# Patient Record
Sex: FEMALE | Race: WHITE
[De-identification: names, ages, dates, MRNs, and addresses within clinical notes are randomized per-mention and may not be internally consistent; named-entity substitution may affect disease eponyms.]

---

## 2017-08-09 ENCOUNTER — HOSPITAL ENCOUNTER (OUTPATIENT)
Dept: HOSPITAL 45 - C.PAPS | Age: 72
Discharge: HOME | End: 2017-08-09
Attending: OBSTETRICS & GYNECOLOGY
Payer: COMMERCIAL

## 2017-08-09 DIAGNOSIS — Z12.4: Primary | ICD-10-CM

## 2018-02-14 ENCOUNTER — HOSPITAL ENCOUNTER (OUTPATIENT)
Dept: HOSPITAL 45 - C.GI | Age: 73
Discharge: HOME | End: 2018-02-14
Attending: INTERNAL MEDICINE
Payer: COMMERCIAL

## 2018-02-14 VITALS — SYSTOLIC BLOOD PRESSURE: 146 MMHG | DIASTOLIC BLOOD PRESSURE: 80 MMHG | HEART RATE: 75 BPM | OXYGEN SATURATION: 97 %

## 2018-02-14 VITALS
HEIGHT: 62.01 IN | WEIGHT: 135.28 LBS | BODY MASS INDEX: 24.58 KG/M2 | BODY MASS INDEX: 24.58 KG/M2 | HEIGHT: 62.01 IN | WEIGHT: 135.28 LBS

## 2018-02-14 VITALS — TEMPERATURE: 98.06 F

## 2018-02-14 DIAGNOSIS — R93.3: ICD-10-CM

## 2018-02-14 DIAGNOSIS — I10: ICD-10-CM

## 2018-02-14 DIAGNOSIS — Z79.82: ICD-10-CM

## 2018-02-14 DIAGNOSIS — Z90.89: ICD-10-CM

## 2018-02-14 DIAGNOSIS — K44.9: ICD-10-CM

## 2018-02-14 DIAGNOSIS — Z79.899: ICD-10-CM

## 2018-02-14 DIAGNOSIS — R13.10: Primary | ICD-10-CM

## 2018-02-14 DIAGNOSIS — Z98.890: ICD-10-CM

## 2018-02-14 DIAGNOSIS — M19.90: ICD-10-CM

## 2018-02-14 NOTE — DISCHARGE INSTRUCTIONS
Endoscopy Patient Instructions


Date / Procedure(s) Performed


Feb 14, 2018.


EGD





Allergy Information


Coded Allergies:  


     No Known Allergies (Unverified , 2/14/18)





Discharge Date / Findings


Feb 14, 2018.


Large hiatal hernia





Medication Instructions


Stopped Medication(s):  


ASPIRIN 81MG 2/13/18 2300


OK to resume all medications today as prescribed





Reported Home Medications








 Medications  Dose


 Route/Sig


 Max Daily Dose Days Date Category


 


 Vitamin D3


  (Cholecalciferol)


 400 Inter.unit Tab  


 


    2/1/18 Reported


 


 Vitamin D3


  (Cholecalciferol)


 400 Unit Cap  400 Units


 PO QPM


    2/1/18 Reported


 


 Senokot S


  (Senna/Docusate


 Sodium) 1 Tab Tab  1 Tab


 PO HS PRN


    2/1/18 Reported


 


 Zestril


  (Lisinopril) 40


 Mg Tab  40 Mg


 PO QAM


    2/1/18 Reported


 


 Advil (Ibuprofen)


 200 Mg Tab  400 Mg


 PO TID PRN


    2/1/18 Reported


 


 Omega-3 (Fish


 Oil) 1 Ea Cap  1 Cap


 PO BID


    2/1/18 Reported


 


 Coq10 (Coenzyme


 Q10


  (Ubidecarenone))


 100 Mg Cap  100 Mg


 PO QAM


    2/1/18 Reported


 


 Centrum Adults


  (Multiple


 Vitamins W/


 Minerals) 1 Tab


 Tab  1 Tab


 PO QAM


    2/1/18 Reported


 


 Calcium 600 Mg Tab  600 Mg


 PO BID


    2/1/18 Reported


 


 Aspirin Ec


  (Aspirin) 81 Mg


 Tab  81 Mg


 PO QPM


    2/1/18 Reported











Provider Instructions





Activity Restrictions





-  No exercising or heavy lifting for 24 hours. 


-  Do not drink alcohol the day of the procedure.


-  Do not drive a car or operate machinery until the day after the procedure.


-  Do not make any important decisions or sign important papers in 24 hours 

after the procedure.





Following Day:





-  Return to full activity which may include returning to work/school.





Diet





Start your diet with liquids and light foods (jello, soup, juice, toast).  Then 

eat your usual diet if not nauseated.





Treatment For Common After Affects





For mild abdominal pain, bloating, or excessive gas:





-  Rest


-  Eat lightly


-  Lie on right side





Follow-Up Information


Follow-up with JESSICA HERNANDES as scheduled





Anesthesia Information





What You Should Know





You have had a procedure that required some medicine to reduce anxiety and 

discomfort. This treatment is called moderate sedation.  


After receiving the treatment, you may be sleepy, but you will be able to 

breathe on your own.  The effects of the treatment may last for several hours.








Follow these instructions along with Activity/Diet recommendations noted above:





*  Do NOT do anything where dizziness or clumsiness would be dangerous.





*  Rest quietly at home today, then you can be up and about tomorrow.





*  Have a responsible person stay with you the rest of today.





*  You may have had an I.V. today.  If so, you may take the dressing off later 

today.





Recommendations


 


Call your doctor if:





*  Trouble breathing 





*  Continuous vomiting for more than 24 hours








*  Temperature above 101 degrees





*  Severe abdominal pain or bloating





*  Pain not relieved by pain medicine ordered





*  There is increased drainage or redness from any incision





*  A large amount of rectal bleeding greater than 2-3 tablespoons. 


   (If you had a polyp/s removed or have hemorrhoids, a small amount of blood -


    from the rectum is to be expected.)





*  You have any unanswered questions or concerns.








IN THE EVENT OF A SERIOUS EMERGENCY, GO TO THE NEAREST EMERGENCY ROOM








       Your discharge instructions were prepared by provider Tashi Nunes.





 Patient Instructions Signature Page














Honey Morrell 











Patient (or Guardian) Signature/Date:____________________________________ I 

have read and understand the instructions given to me by my caregivers.








Caregiver/RN/Doctor Signature/Date:____________________________________











The above-named patient and/or guardian has received patient instructions on 

this date.





























+  Original Patient Signature Page (only) stays with chart.  Please make copy 

for patient.

## 2018-02-14 NOTE — ENDO HISTORY AND PHYSICAL
History & Physical


Date of Service:


Feb 14, 2018.


Chief Complaint:


ABNORMAL BARIUM SWALLOW, DYSPHAGIA


Referring Physician:


JESSICA HERNANDES


History of Present Illness


71 yo CF who presents for EGD secondary to esophageal dysphagia and abnormal 

barium swallow.





Past Surgical History


Hx Cardiac Surgery:  No


Hx Internal Defibrillator:  No


Hx Pacemaker:  No


Hx Abdominal Surgery:  No


Hx of Implantable Prosthesis:  No


Hx Post-Op Nausea and Vomiting:  No


Hx Cancer Surgery:  No


Hx Thoracic Surgery:  No


Hx Orthopedic:  No


Hx Urinary Tract Surgery:  No





Family History


None





Social History


Smoking Status:  Never Smoker


Hx Substance Use:  No


Hx Alcohol Use:  Yes (RARELY)





Allergies


Coded Allergies:  


     No Known Allergies (Unverified , 2/14/18)





Current Medications





Reported Home Medications








 Medications  Dose


 Route/Sig


 Max Daily Dose Days Date Category


 


 Vitamin D3


  (Cholecalciferol)


 400 Inter.unit Tab  


 


    2/1/18 Reported


 


 Vitamin D3


  (Cholecalciferol)


 400 Unit Cap  400 Units


 PO QPM


    2/1/18 Reported


 


 Senokot S


  (Senna/Docusate


 Sodium) 1 Tab Tab  1 Tab


 PO HS PRN


    2/1/18 Reported


 


 Zestril


  (Lisinopril) 40


 Mg Tab  40 Mg


 PO QAM


    2/1/18 Reported


 


 Advil (Ibuprofen)


 200 Mg Tab  400 Mg


 PO TID PRN


    2/1/18 Reported


 


 Omega-3 (Fish


 Oil) 1 Ea Cap  1 Cap


 PO BID


    2/1/18 Reported


 


 Coq10 (Coenzyme


 Q10


  (Ubidecarenone))


 100 Mg Cap  100 Mg


 PO QAM


    2/1/18 Reported


 


 Centrum Adults


  (Multiple


 Vitamins W/


 Minerals) 1 Tab


 Tab  1 Tab


 PO QAM


    2/1/18 Reported


 


 Calcium 600 Mg Tab  600 Mg


 PO BID


    2/1/18 Reported


 


 Aspirin Ec


  (Aspirin) 81 Mg


 Tab  81 Mg


 PO QPM


    2/1/18 Reported











Vital Signs


Weight (Kilograms):  61.36


Height (Feet):  5


Height (Inches):  2











  Date Time  Temp Pulse Resp B/P (MAP) Pulse Ox O2 Delivery O2 Flow Rate FiO2


 


2/14/18 11:40 36.7 88 20 161/81 (107) 98 Room Air  











Physical Exam


General Appearance:  WD/WN, no apparent distress


Respiratory/Chest:  


   Auscultation:  breath sounds normal


Cardiovascular:  


   Heart Auscultation:  RRR


Abdomen:  


   Bowel Sounds:  normal


   Inspection & Palpation:  soft, non-distended, no tenderness, guarding & 

rebound





Assessment and Plan


Assessment:


71 yo CF who presents for EGD secondary to esophageal dysphagia and abnormal 

barium swallow.








Plan:


Proceed with EGD.

## 2018-02-14 NOTE — GI REPORT
Procedure Date: 2/14/2018 11:50 AM

Procedure:            Upper GI endoscopy

Indications:          Dysphagia, Abnormal UGI series

Medicines:            Monitored Anesthesia Care

Complications:        No immediate complications.

Estimated Blood Loss: Estimated blood loss: none.

Procedure:            Pre-Anesthesia Assessment:

                      - Prior to the procedure, a History and Physical was 

                      performed, and patient medications and allergies were 

                      reviewed. The patient's tolerance of previous 

                      anesthesia was also reviewed. The risks and benefits of 

                      the procedure and the sedation options and risks were 

                      discussed with the patient. All questions were 

                      answered, and informed consent was obtained. Prior 

                      Anticoagulants: The patient has taken aspirin, last 

                      dose was 1 day prior to procedure. ASA Grade 

                      Assessment: III - A patient with severe systemic 

                      disease. After reviewing the risks and benefits, the 

                      patient was deemed in satisfactory condition to undergo 

                      the procedure.

                      After obtaining informed consent, the endoscope was 

                      passed under direct vision. Throughout the procedure, 

                      the patient's blood pressure, pulse, and oxygen 

                      saturations were monitored continuously. The scope was 

                      introduced through the mouth, and advanced to the 

                      second part of duodenum. The upper GI endoscopy was 

                      accomplished without difficulty. The patient tolerated 

                      the procedure well.

Findings:

     The esophagus was normal.

     A large hiatal hernia was present.

     The examined duodenum was normal.

Impression:           - Normal esophagus.

                      - Large hiatal hernia.

                      - Normal examined duodenum.

                      - No specimens collected.

Recommendation:       - Resume previous diet.

                      - Continue present medications.

                      - Refer to a surgeon.

                      - Return to primary care physician as previously 

                      scheduled.

Tashi Nunes DO

2/14/2018 12:19:47 PM

This report has been signed electronically.

Note Initiated On: 2/14/2018 11:50 AM

     I attest to the content of the Intraoperative Record and orders 

     documented therein, exceptions below

## 2018-02-14 NOTE — ANESTHESIOLOGY PROGRESS NOTE
Anesthesia Post Op Note


Date & Time


Feb 14, 2018 at 13:15





Vital Signs


Pain Intensity:  0





Vital Signs Past 12 Hours








  Date Time  Temp Pulse Resp B/P (MAP) Pulse Ox O2 Delivery O2 Flow Rate FiO2


 


2/14/18 12:47  75 20 146/80 (102) 97 Room Air  


 


2/14/18 12:36  84 20 150/69 (96) 97 Room Air  


 


2/14/18 12:29  87 20 131/65 (87) 98 Room Air  


 


2/14/18 12:23  81 20 132/69 (90) 99 Room Air  


 


2/14/18 11:40 36.7 88 20 161/81 (107) 98 Room Air  











Notes


Mental Status:  alert / awake / arousable, participated in evaluation


Pt Amnestic to Procedure:  Yes


Nausea / Vomiting:  adequately controlled


Pain:  adequately controlled


Airway Patency, RR, SpO2:  stable & adequate


BP & HR:  stable & adequate


Hydration State:  stable & adequate


Anesthetic Complications:  no major complications apparent

## 2018-04-11 ENCOUNTER — HOSPITAL ENCOUNTER (OUTPATIENT)
Dept: HOSPITAL 45 - C.ACU | Age: 73
Setting detail: OBSERVATION
LOS: 1 days | Discharge: HOME | End: 2018-04-12
Attending: THORACIC SURGERY (CARDIOTHORACIC VASCULAR SURGERY) | Admitting: THORACIC SURGERY (CARDIOTHORACIC VASCULAR SURGERY)
Payer: COMMERCIAL

## 2018-04-11 VITALS
SYSTOLIC BLOOD PRESSURE: 118 MMHG | OXYGEN SATURATION: 97 % | HEART RATE: 74 BPM | TEMPERATURE: 98.06 F | DIASTOLIC BLOOD PRESSURE: 58 MMHG

## 2018-04-11 VITALS
BODY MASS INDEX: 24.89 KG/M2 | HEIGHT: 62 IN | BODY MASS INDEX: 24.89 KG/M2 | HEIGHT: 62 IN | WEIGHT: 135.28 LBS | WEIGHT: 135.28 LBS

## 2018-04-11 VITALS — HEART RATE: 79 BPM | TEMPERATURE: 98.06 F | DIASTOLIC BLOOD PRESSURE: 81 MMHG | SYSTOLIC BLOOD PRESSURE: 175 MMHG

## 2018-04-11 VITALS
TEMPERATURE: 98.06 F | OXYGEN SATURATION: 99 % | DIASTOLIC BLOOD PRESSURE: 67 MMHG | SYSTOLIC BLOOD PRESSURE: 132 MMHG | HEART RATE: 66 BPM

## 2018-04-11 VITALS — HEART RATE: 72 BPM | OXYGEN SATURATION: 99 % | DIASTOLIC BLOOD PRESSURE: 72 MMHG | SYSTOLIC BLOOD PRESSURE: 148 MMHG

## 2018-04-11 VITALS
TEMPERATURE: 97.88 F | OXYGEN SATURATION: 99 % | DIASTOLIC BLOOD PRESSURE: 68 MMHG | SYSTOLIC BLOOD PRESSURE: 130 MMHG | HEART RATE: 61 BPM

## 2018-04-11 VITALS
HEART RATE: 101 BPM | SYSTOLIC BLOOD PRESSURE: 149 MMHG | OXYGEN SATURATION: 94 % | DIASTOLIC BLOOD PRESSURE: 72 MMHG | TEMPERATURE: 98.06 F

## 2018-04-11 VITALS
SYSTOLIC BLOOD PRESSURE: 138 MMHG | TEMPERATURE: 98.24 F | HEART RATE: 87 BPM | OXYGEN SATURATION: 93 % | DIASTOLIC BLOOD PRESSURE: 73 MMHG

## 2018-04-11 VITALS — DIASTOLIC BLOOD PRESSURE: 64 MMHG | SYSTOLIC BLOOD PRESSURE: 127 MMHG | OXYGEN SATURATION: 97 % | HEART RATE: 81 BPM

## 2018-04-11 VITALS — OXYGEN SATURATION: 99 %

## 2018-04-11 DIAGNOSIS — Z79.82: ICD-10-CM

## 2018-04-11 DIAGNOSIS — Z80.0: ICD-10-CM

## 2018-04-11 DIAGNOSIS — I10: ICD-10-CM

## 2018-04-11 DIAGNOSIS — Z78.0: ICD-10-CM

## 2018-04-11 DIAGNOSIS — Z98.51: ICD-10-CM

## 2018-04-11 DIAGNOSIS — Z90.89: ICD-10-CM

## 2018-04-11 DIAGNOSIS — K44.9: ICD-10-CM

## 2018-04-11 DIAGNOSIS — K21.9: Primary | ICD-10-CM

## 2018-04-11 DIAGNOSIS — M85.80: ICD-10-CM

## 2018-04-11 DIAGNOSIS — E78.00: ICD-10-CM

## 2018-04-11 PROCEDURE — 43282 LAP PARAESOPH HER RPR W/MESH: CPT

## 2018-04-11 RX ADMIN — KETOROLAC TROMETHAMINE SCH MG: 15 INJECTION INTRAMUSCULAR; INTRAVENOUS at 14:20

## 2018-04-11 RX ADMIN — ACETAMINOPHEN SCH MLS/HR: 10 INJECTION, SOLUTION INTRAVENOUS at 14:20

## 2018-04-11 RX ADMIN — ACETAMINOPHEN SCH MLS/HR: 10 INJECTION, SOLUTION INTRAVENOUS at 22:23

## 2018-04-11 RX ADMIN — METOCLOPRAMIDE SCH MG: 5 INJECTION, SOLUTION INTRAMUSCULAR; INTRAVENOUS at 18:33

## 2018-04-11 RX ADMIN — KETOROLAC TROMETHAMINE SCH MG: 15 INJECTION INTRAMUSCULAR; INTRAVENOUS at 22:32

## 2018-04-11 RX ADMIN — DOCUSATE SODIUM SCH MG: 100 CAPSULE, LIQUID FILLED ORAL at 22:31

## 2018-04-11 NOTE — OPERATIVE REPORT
DATE OF OPERATION:  04/11/2018

 

PREOPERATIVE DIAGNOSIS:  Hiatal hernia with gastroesophageal reflux disease

refractory to medical management.

 

POSTOPERATIVE DIAGNOSIS:  Same.

 

PROCEDURES PERFORMED:

1.  Robotic-assisted laparoscopic repair of hiatal hernia.

2.  Placement of biologic patch over hiatal repair.

3.  Toupet partial fundoplication.

 

SURGEON:  Gonsalo Bradley MD

 

ASSISTANT:  ANABELL Smalls (MrElyssa Wells was present for the entire

case.  He was instrumental at the patient's bedside, changing instruments and

placing and removing suture needles and closed the skin incisions at the

conclusion of the case)

 

ANESTHESIA:  General anesthesia endotracheal intubation.

 

INDICATION FOR PROCEDURE AND FINDINGS:  Honey Morrell is a 72-year-old

female who has a history of gastroesophageal reflux disease.  She is a

retired nurse and understands gastroesophageal reflux.  She has been on

multiple medications and she has not had response as she had hoped.  She

underwent an upper endoscopy by Dr. Tashi Nunes and had a large hiatal

hernia.  Also, on her barium swallow, she appeared to have some dysmotility. 

For this reason, I elected to proceed with a partial fundoplication.  We 
discussed

this in detail in the office.

 

On 04/11/2018, the patient was brought to the operating room and underwent an

uncomplicated repair.  Her hiatal defect was repaired with 2 sutures and then

I placed a biologic patch to reinforce this.  It was a small 3 x 5 cm

patch.  I then did a Toupet partial fundoplication.  I did an upper endoscopy

at the conclusion of the case and this looked quite good.  She tolerated it

well.

 

DESCRIPTION OF PROCEDURE:  The patient was brought to the operating room and 
laid

in the supine position.  General anesthesia induced and endotracheal intubation

was performed.  After prepping and draping in the usual sterile fashion,

calling appropriate timeout and giving prophylactic antibiotics, an incision

was made in the midline a few centimeters above the umbilicus.  A Veress

needle was used to go through this and carbon dioxide was insufflated into

the abdominal cavity.  I then used a 5 mm port to go in the same space and with 
a

5 mm 30-degree scope, we were able to see there were no adhesions.  I then

placed an 8 mm scope in the mid clavicular line, right at the costal margin

on the left and then also on the right, I did make it a few centimeters

below the costal margin to get around the ligamentum teres.  I then placed

two 5 mm ports laterally, the left one for a robotic retractor and the right

one for the liver retractor which would stay in place.  We then docked the

robot, placed the patient in a reverse Trendelenburg and then proceeded with

the repair.

 

Using a vessel sealer, I took down the short gastrics right away and went

right down to the left crural area and freed up the esophagus nicely in the

stomach.  We were able to easily reduce the stomach.  There really was not

much of a sac.  I then cleaned off the right crura without difficulty using a

[place] bipolar dissector.  I identified the vagus nerves.  There really was

not a sac to remove per se, we got plenty of esophagus into the abdominal

cavity.  Two 0 silk sutures were used in simple fashion to reapproximate the

posterior crura nicely.  We did not do it too tightly and left a small space

posteriorly.  I then took an OviTex absorbable mesh and cut it to 3.5.  After

we soaked this for several minutes, we had cut this in a U and then I used

2-0 Vicryl suture to suture in place to buttress the repair.  It was not in

contact at the esophagus.  It was sutured to the more lateral crura on both

sides.

 

We had freed up plenty of space in the retroesophageal and gastric area and

pulled the fundus of the stomach in a retroesophageal space.  This sat very

nicely under no tension.  2-0 silk sutures were used to anchor this to the

anterior crura, esophagus and then to the fundus on the right side.  I then

took 2 more sutures down to ensure that we had at least 4 cm of esophageal

length in the intra-abdominal side of the hiatus.  We then put 3 more sutures

on the left side of 2-0 silk, 1 to the crura into the esophagus and fundus

and then 2 more to the fundus and the esophagus.  This laid very nicely.  We

then undocked.  I then removed all of our instruments and then we put the

patient back in a supine position, I did an upper endoscopy.  Upon placing

the endoscope, this was done without difficulty and I really did not see much

in the way of any esophageal strictures or abnormalities.  I went into the

stomach without difficulty.  We were not very tight at the junction.  I then

retroflexed and it looked quite good, we had a good segment on the

intra-abdominal aspect of the hiatus.  We then filled the stomach with air

and there was no bubbling down at the operative site.  I then sucked this out

and slowly removed the endoscope.

 

I then scrubbed back in and we used 0 PDS to close the two 12 mm ports where

the camera and the assistant's port were.  We then used 4-0 Monocryl in

running continuous fashion to close the skin edges.  She tolerated it very

well and was extubated in the room without difficulty.  We essentially had no

blood loss.

 

 

I attest to the content of the Intraoperative Record and any orders documented 
therein. Any exceptions are noted below.

 

 

 

W[place]KA

## 2018-04-11 NOTE — [facility] POST OPERATIVE BRIEF NOTE
Immediate Operative Summary


Operative Date


Apr 11, 2018.





Pre-Operative Diagnosis





Hiatal Hernia





Post-Operative Diagnosis





Same





Procedure(s) Performed





Robot Assisted Laparoscopic Nissen Fundoplication, Esophagogastroduodenoscopy





Surgeon


Dr Bradley





Assistant Surgeon(s)


Frankie Wells PA-C





Estimated Blood Loss


10ml





Findings


Consistent with Post-Op Diagnosis





Specimens





None





Anesthesia Type


General

## 2018-04-11 NOTE — ANESTHESIOLOGY PROGRESS NOTE
Anesthesia Post Op Note


Date & Time


Apr 11, 2018 at 11:08





Vital Signs


Pain Intensity:  4.0





Vital Signs Past 12 Hours








  Date Time  Temp Pulse Resp B/P (MAP) Pulse Ox O2 Delivery O2 Flow Rate FiO2


 


4/11/18 11:00  61 16 131/67 100 Oxymask 7 


 


4/11/18 10:50  60 16 127/63 100 Oxymask 7 


 


4/11/18 10:41 36.7 62 16 157/80 100 Oxymask 7 


 


4/11/18 06:01 36.7 79 20 175/81  Room Air  











Notes


Mental Status:  alert / awake / arousable, participated in evaluation


Pt Amnestic to Procedure:  Yes


Nausea / Vomiting:  adequately controlled


Pain:  adequately controlled


Airway Patency, RR, SpO2:  stable & adequate


BP & HR:  stable & adequate


Hydration State:  stable & adequate


Anesthetic Complications:  no major complications apparent

## 2018-04-12 VITALS
TEMPERATURE: 98.42 F | HEART RATE: 79 BPM | OXYGEN SATURATION: 95 % | SYSTOLIC BLOOD PRESSURE: 146 MMHG | DIASTOLIC BLOOD PRESSURE: 82 MMHG

## 2018-04-12 VITALS
SYSTOLIC BLOOD PRESSURE: 146 MMHG | DIASTOLIC BLOOD PRESSURE: 74 MMHG | OXYGEN SATURATION: 97 % | HEART RATE: 85 BPM | TEMPERATURE: 98.6 F

## 2018-04-12 VITALS — SYSTOLIC BLOOD PRESSURE: 152 MMHG | HEART RATE: 79 BPM | DIASTOLIC BLOOD PRESSURE: 78 MMHG

## 2018-04-12 VITALS
SYSTOLIC BLOOD PRESSURE: 146 MMHG | HEART RATE: 79 BPM | OXYGEN SATURATION: 95 % | TEMPERATURE: 98.42 F | DIASTOLIC BLOOD PRESSURE: 82 MMHG

## 2018-04-12 VITALS
HEART RATE: 79 BPM | SYSTOLIC BLOOD PRESSURE: 160 MMHG | OXYGEN SATURATION: 94 % | DIASTOLIC BLOOD PRESSURE: 74 MMHG | TEMPERATURE: 98.42 F

## 2018-04-12 LAB
CREAT SERPL-MCNC: 0.78 MG/DL (ref 0.6–1.2)
EOSINOPHIL NFR BLD AUTO: 154 K/UL (ref 130–400)
HCT VFR BLD CALC: 35.3 % (ref 37–47)
HGB BLD-MCNC: 11.9 G/DL (ref 12–16)
INR PPP: 1.1 (ref 0.9–1.1)
MCH RBC QN AUTO: 32.2 PG (ref 25–34)
MCHC RBC AUTO-ENTMCNC: 33.7 G/DL (ref 32–36)
MCV RBC AUTO: 95.7 FL (ref 80–100)
PMV BLD AUTO: 10.1 FL (ref 7.4–10.4)
PTT PATIENT: 26.8 SECONDS (ref 21–31)
RED CELL DISTRIBUTION WIDTH CV: 13 % (ref 11.5–14.5)
RED CELL DISTRIBUTION WIDTH SD: 46 FL (ref 36.4–46.3)
WBC # BLD AUTO: 11 K/UL (ref 4.8–10.8)

## 2018-04-12 RX ADMIN — DOCUSATE SODIUM SCH MG: 100 CAPSULE, LIQUID FILLED ORAL at 09:02

## 2018-04-12 RX ADMIN — METOCLOPRAMIDE SCH MG: 5 INJECTION, SOLUTION INTRAMUSCULAR; INTRAVENOUS at 01:51

## 2018-04-12 RX ADMIN — KETOROLAC TROMETHAMINE SCH MG: 15 INJECTION INTRAMUSCULAR; INTRAVENOUS at 05:47

## 2018-04-12 RX ADMIN — ACETAMINOPHEN SCH MLS/HR: 10 INJECTION, SOLUTION INTRAVENOUS at 05:48

## 2018-04-12 NOTE — DISCHARGE INSTRUCTIONS
Discharge Instructions


Date of Service


Apr 12, 2018.





Admission


Reason for Admission:  Hiatal Hernia W/Gerd, Lower Esophageal Sch[place] Ri





Discharge


Discharge Diagnosis / Problem:  Hiatal Hernia W/Gerd





Discharge Goals


Goal(s):  Decrease discomfort





Activity Recommendations


Activity Limitations:  as noted below


Lifting Limitations:  no more than 10 pounds


1.  Do not lift objects heavier than 10 pounds until cleared to do so by Dr. Bradley.





2.  Do not drive until cleared to do so by Dr. Bradley.


.





Instructions / Follow-Up


Instructions / Follow-Up


1.  Office appointment with Dr. Bradley in 1 week.  Office will call you with 

date and time of appointment.





2.  Continue a liquid diet..  Do not eat any solid food that requires chewing 

until cleared to do so by Dr. Bradley.





3.  You may remove dressings in 3 days and shower thereafter.  No tub baths.





Current Hospital Diet


Patient's current hospital diet: Clear Liquid Diet





Discharge Diet


Recommended Diet:  Full Liquid Diet





Procedures


Procedures Performed:  


Robot Assisted Laparoscopic Nissen Fundoplication, Esophagogastroduodenoscopy





Pending Studies


Studies pending at discharge:  no





Medical Emergencies








.


Who to Call and When:





Medical Emergencies:  If at any time you feel your situation is an emergency, 

please call 911 immediately.





.





Non-Emergent Contact


Non-Emergency issues call your:  Surgeon


Call Non-Emergent contact if:  you have a fever, your pain is not controlled, 

wound has increased drainage


.








"Provider Documentation" section prepared by Prieto Wells.








.

## 2018-04-12 NOTE — DISCHARGE SUMMARY
DISCHARGE DIAGNOSIS:  Hiatal hernia with gastroesophageal reflux,

unresponsive to medical therapy.

 

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

above complaints.  She was found to have a fairly large hiatal hernia on

upper endoscopy.  As stated, she was unresponsive to medical management.

 

On 04/11/2018, I brought the patient to the operating room and did an

uncomplicated robotic-assisted laparoscopic repair of her hiatal hernia and a

Toupet partial fundoplication.  I elected not to do a full Nissen wrap

because the patient had some dysmotility of her esophagus.  She did very well

with the surgery without blood loss.  She awakened from it without

difficulty.  She was a bit "shaky" overnight and her blood sugar was found to

be a bit elevated; however, this settled down over the course of the night. 

The patient was ambulating in the hallway.  She was tolerating liquids.  A

barium swallow done the following morning showed no evidence of

extravasation.  There is no evidence of reflux and flow went through the

repair site quite nicely.  Quite pleased with the fact that she has no

symptoms of reflux and she feels better.  She was discharged home on postop

day 1.  I will see her back in the office.  She did not require pain

medications and I told to resume her regular medications.  We had a long talk

about diet and restrictions including the fact that she may not eat anything

until I see her back in the office and clear her.  I will see her back next

week.  She is to stay on liquids.

## 2018-04-12 NOTE — DIAGNOSTIC IMAGING REPORT
(BARIUM SWALLOW) ESOPHAGUS





CLINICAL HISTORY: 72 years-old Female with fundoplication .  Status post

fundoplication.



TECHNIQUE: Barium contrast was administered to the patient under fluoroscopic

examination.  Multiple images were obtained and submitted for review.



FLUOROSCOPY TIME:  0.4 minutes. 5 images were submitted.



COMPARISON: None.



FINDINGS: 



During deglutition, contrast material flowed freely through the cervical

esophagus.  No filling defect or mucosal abnormality is identified.  No abnormal

stricturing or mass effect is seen.  The mid to distal esophagus is well coated

and distended.  No abnormal stricturing or mucosal abnormality is identified. 

No significant reflux identified. Status post fundoplication. Mild tertiary

contractions of the distal esophagus. No evidence of contrast extravasation to

suggest mucosal injury.





IMPRESSION:  

1. Status post fundoplication without evidence of contrast extravasation to

suggest mucosal injury.

2. Mild tertiary contractions of the distal esophagus. 











The above report was generated using voice recognition software. It may contain

grammatical, syntax or spelling errors.







Electronically signed by:  Prince Rodriguez M.D.

4/12/2018 8:45 AM



Dictated Date/Time:  4/12/2018 8:41 AM

## 2018-07-25 ENCOUNTER — HOSPITAL ENCOUNTER (OUTPATIENT)
Dept: HOSPITAL 45 - C.MAMM | Age: 73
Discharge: HOME | End: 2018-07-25
Attending: OBSTETRICS & GYNECOLOGY
Payer: COMMERCIAL

## 2018-07-25 DIAGNOSIS — Z12.31: Primary | ICD-10-CM

## 2018-07-26 NOTE — MAMMOGRAPHY REPORT
BILATERAL DIGITAL SCREENING MAMMOGRAM TOMOSYNTHESIS WITH CAD: 7/25/2018



TECHNIQUE: The study was acquired using full field digital technology and interpreted from soft copy.
 Breast tomosynthesis in addition to standard 2D mammography was performed. Current study was also ev
aluated with a Computer Aided Detection (CAD) system.  



COMPARISON: Comparison is made to exams dated:  5/11/2016 mammogram - [facility], 8
/11/2009, 3/15/2011 mammogram, 12/5/2012 mammogram, and 11/11/2014 mammogram - [facility].   

BREAST COMPOSITION: There are scattered areas of fibroglandular density in both breasts.  



FINDINGS: 

No suspicious masses, calcifications, or areas of architectural distortion are noted in either breast
. There has been no significant interval change compared to prior exams.  



IMPRESSION: ACR BI-RADS CATEGORY 1: NEGATIVE

There is no mammographic evidence of malignancy. A 1 year screening mammogram is recommended.(07/26/2
019)  The patient will receive written notification of the results.  





Some breast cancers are not detected with mammography. A negative mammographic report should not dung
y biopsy if a clinically suggestive mass is present.



Jie Montoya M.D.          

ah/:7/25/2018 10:33:21  



Imaging Technologist: RT Nguyễn(RAMON)(M), [facility]

letter sent: Normal 1/2  

BI-RADS Code: ACR BI-RADS Category 1: Negative